# Patient Record
Sex: FEMALE | Race: WHITE | Employment: OTHER | URBAN - NONMETROPOLITAN AREA
[De-identification: names, ages, dates, MRNs, and addresses within clinical notes are randomized per-mention and may not be internally consistent; named-entity substitution may affect disease eponyms.]

---

## 2020-06-23 ENCOUNTER — TELEPHONE (OUTPATIENT)
Dept: VASCULAR SURGERY | Age: 60
End: 2020-06-23

## 2020-06-23 NOTE — TELEPHONE ENCOUNTER
Information given to [facility].  Norma Snyder will be contacted regarding this referral.

## 2020-06-25 ENCOUNTER — OFFICE VISIT (OUTPATIENT)
Dept: VASCULAR SURGERY | Age: 60
End: 2020-06-25
Payer: MEDICARE

## 2020-06-25 VITALS
TEMPERATURE: 96 F | HEART RATE: 96 BPM | OXYGEN SATURATION: 96 % | SYSTOLIC BLOOD PRESSURE: 126 MMHG | DIASTOLIC BLOOD PRESSURE: 76 MMHG | RESPIRATION RATE: 18 BRPM

## 2020-06-25 PROBLEM — C50.919 METASTATIC BREAST CANCER (HCC): Status: ACTIVE | Noted: 2020-06-25

## 2020-06-25 PROBLEM — I82.622 ACUTE DEEP VEIN THROMBOSIS (DVT) OF LEFT UPPER EXTREMITY (HCC): Status: ACTIVE | Noted: 2020-06-25

## 2020-06-25 PROCEDURE — G8427 DOCREV CUR MEDS BY ELIG CLIN: HCPCS | Performed by: NURSE PRACTITIONER

## 2020-06-25 PROCEDURE — 1036F TOBACCO NON-USER: CPT | Performed by: NURSE PRACTITIONER

## 2020-06-25 PROCEDURE — 3017F COLORECTAL CA SCREEN DOC REV: CPT | Performed by: NURSE PRACTITIONER

## 2020-06-25 PROCEDURE — G8421 BMI NOT CALCULATED: HCPCS | Performed by: NURSE PRACTITIONER

## 2020-06-25 PROCEDURE — 99203 OFFICE O/P NEW LOW 30 MIN: CPT | Performed by: NURSE PRACTITIONER

## 2020-06-25 RX ORDER — FLUTICASONE PROPIONATE 50 MCG
1 SPRAY, SUSPENSION (ML) NASAL DAILY
COMMUNITY

## 2020-06-25 RX ORDER — BETHANECHOL CHLORIDE 25 MG/1
25 TABLET ORAL 3 TIMES DAILY
COMMUNITY

## 2020-06-25 RX ORDER — THIAMINE MONONITRATE (VIT B1) 100 MG
100 TABLET ORAL DAILY
COMMUNITY

## 2020-06-25 RX ORDER — CALCIUM CARBONATE 500(1250)
600 TABLET ORAL DAILY
COMMUNITY

## 2020-06-25 RX ORDER — DULOXETIN HYDROCHLORIDE 60 MG/1
60 CAPSULE, DELAYED RELEASE ORAL DAILY
COMMUNITY

## 2020-06-25 RX ORDER — VIT C/B6/B5/MAGNESIUM/HERB 173 50-5-6-5MG
1 CAPSULE ORAL DAILY
COMMUNITY

## 2020-06-25 RX ORDER — OMEPRAZOLE 40 MG/1
40 CAPSULE, DELAYED RELEASE ORAL DAILY
COMMUNITY

## 2020-06-25 RX ORDER — DIAZEPAM 10 MG/1
10 TABLET ORAL EVERY 6 HOURS PRN
COMMUNITY

## 2020-06-25 RX ORDER — HYDROCODONE BITARTRATE AND ACETAMINOPHEN 7.5; 325 MG/1; MG/1
1 TABLET ORAL EVERY 6 HOURS PRN
COMMUNITY

## 2020-06-25 RX ORDER — GABAPENTIN 600 MG/1
800 TABLET ORAL 3 TIMES DAILY
COMMUNITY

## 2020-06-25 RX ORDER — BUDESONIDE AND FORMOTEROL FUMARATE DIHYDRATE 160; 4.5 UG/1; UG/1
2 AEROSOL RESPIRATORY (INHALATION) 2 TIMES DAILY
COMMUNITY

## 2020-06-25 RX ORDER — MAGNESIUM 30 MG
250 TABLET ORAL DAILY
COMMUNITY

## 2020-06-25 RX ORDER — RANITIDINE 300 MG/1
300 CAPSULE ORAL EVERY EVENING
COMMUNITY
End: 2020-06-30

## 2020-06-25 RX ORDER — RALOXIFENE HYDROCHLORIDE 60 MG/1
60 TABLET, FILM COATED ORAL DAILY
COMMUNITY

## 2020-06-25 NOTE — PROGRESS NOTES
Patient Care Team:  JULIUS Juárez - CNP as PCP - General (Nurse Practitioner)      She has a history of breast cancer diagnosed in 2018. She had chemo and she went into remission but then relapsed 10 months later. She had a mediport right subclavian. This was removed when chemo was complete. This was replaced in October. She developed sudden painful arm swelling 2 months ago. She was found to have DVT in left brachial vein. She was treated with xarelto with no success. She was then switched to lovenox and this did not help.  she continues to have significant arm swelling and pain that has only gotten worse. The question is now is this mass compression or a possible subclavian or axillary dvt keeping this from resolving. She is starting to loose some mobility of the arm. Differential diagnosis includes but is not limited to CHF, thyroid disease, venous disease, DVT, SVT, peripheral vascular disease. Myriam Rodirguez is a 61 y.o. female with the following history as recorded in [facility]:  Patient Active Problem List    Diagnosis Date Noted    Metastatic breast cancer ([facility] [address] [address] 06/25/2020    Acute deep vein thrombosis (DVT) of left upper extremity (HCC) 06/25/2020     Current Outpatient Medications   Medication Sig Dispense Refill    omeprazole (PRILOSEC) 40 MG delayed release capsule Take 40 mg by mouth daily      bethanechol (URECHOLINE) 25 MG tablet Take 25 mg by mouth 3 times daily      raNITIdine (ZANTAC) 300 MG capsule Take 300 mg by mouth every evening      calcium carbonate (OSCAL) 500 MG TABS tablet Take 600 mg by mouth daily      raloxifene (EVISTA) 60 MG tablet Take 60 mg by mouth daily      CRANBERRY CONCENTRATE PO Take by mouth      HYDROcodone-acetaminophen (NORCO) 7.5-325 MG per tablet Take 1 tablet by mouth every 6 hours as needed for Pain.  diazePAM (VALIUM) 10 MG tablet Take 10 mg by mouth every 6 hours as needed for Anxiety.       Polyethylene Glycol 3350 (MIRALAX significant hearing loss. Eyes - no sudden vision change or amaurosis. Respiratory - no significant shortness of breath, wheezing, or stridor. No apnea, cough, or chest tightness associated with shortness of breath. Cardiovascular - no chest pain, syncope, or significant dizziness. No palpitations. no significant leg swelling. No claudication. Gastrointestinal - no abdominal swelling or pain. No blood in stool. No severe constipation, diarrhea, nausea, or vomiting. Genitourinary - No difficulty urinating, dysuria, frequency, or urgency. No flank pain or hematuria. Musculoskeletal - no back pain, gait disturbance, or myalgia. Has arm pain and swelling  Skin - no color change, rash, pallor, or new wound. Neurologic - no dizziness, facial asymmetry, or light headedness. No seizures. No speech difficulty or lateralizing weakness. Hematologic - no easy bruising or excessive bleeding. Psychiatric - no severe anxiety or nervousness. No confusion. All other review of systems are negative. Physical Exam    /76 Comment: rigfht  Pulse 96   Temp 96 °F (35.6 °C)   Resp 18   SpO2 96%     Constitutional - well developed, well nourished. No diaphoresis or acute distress. HENT - head normocephalic. Right external ear canal appears normal.  Left external ear canal appears normal.  Septum appears midline. Eyes - conjunctiva normal.  EOMS normal.  No exudate. No icterus. Neck- ROM appears normal, no tracheal deviation. Cardiovascular - Regular rate and rhythm. Heart sounds are normal.  No murmur, rub, or gallop. Carotid pulses are 2+ to palpation bilaterally without bruit. Extremities - Radial and brachial pulses are 2+ to palpation bilaterally. Right femoral pulse: present 2+; Right popliteal pulse: absent Right DP: absent; Right PT absent; Left femoral pulse: present 2+; Left popliteal pulse: absent; Left DP: absent; Left PT: absent  No cyanosis, clubbing.   Has left upper extremity edema that is severe. No signs atheroembolic event. Pulmonary - effort appears normal.  No respiratory distress. Lungs - Breath sounds normal. No wheezes or rales. GI - Abdomen - soft, non tender, bowel sounds X 4 quadrants. No guarding or rebound tenderness. No distension or palpable mass. Genitourinary - deferred. Musculoskeletal - ROM appears normal.  No significant edema. Neurologic - alert and oriented X 3. Physiologic. Skin - warm, dry, and intact. No rash, erythema, or pallor. Psychiatric - mood, affect, and behavior appear normal.  Judgment and thought processes appear normal.    Venous scan - reviewed three venous scans showing     Options have been discussed with the patient including continued medical management. Patient has opted to proceed with continued medical management. Assessment    1. Left arm pain    2. Metastatic breast cancer ([address]    3. Acute deep vein thrombosis (DVT) of brachial vein of left upper extremity (HCC)    4. Arm swelling    5. Port-A-Cath in place          Plan    Ct chest with iv contrast to look for subclavian or axillary dvt or mass compression, possible svc stenosis  Recommend no smoking  Strongly encourage statin therapy    Addendum -   1. No left brachial or left subclavian vein thrombosis is seen. 2. Left arm IV injection with distal left arm arterial opacification   compatible with lower left arm AV fistula. 3. T1 sclerotic change. 4. No acute lung disease     Images reviewed by Dr. Catracho Loving. There is some narrowing in the left subclavian vein. There is also a possible AV fistula. We have recommended she stay on anticoagulation for now. Options have been discussed with the patient including continued medical management vs. proceeding with intraoperative venogram of LUE with possible left subclavian vein angioplasty/stent, lysis. Possible LUE angiogram with possible angioplasty/stent due to AV fistula. Patient has opted to proceed with this. Risks have been discussed with the patient including but not limited to MI, death, CVA, bleed, nerve injury, and infection.

## 2020-06-26 ENCOUNTER — TELEPHONE (OUTPATIENT)
Dept: VASCULAR SURGERY | Age: 60
End: 2020-06-26

## 2020-06-26 NOTE — TELEPHONE ENCOUNTER
I sw the pt to let her know she is scheduled on 6/29/2020 for a CTV chest. The pt will need to arrive at [facility] at 8:45 am and be NPO 4 hours prior. Dr. Maxx Velásquez will be the one to place her IV via u/s guidance. Pt voiced understanding and is aware.

## 2020-06-29 ENCOUNTER — HOSPITAL ENCOUNTER (OUTPATIENT)
Dept: CT IMAGING | Age: 60
Discharge: HOME | End: 2020-06-29
Payer: COMMERCIAL

## 2020-06-29 ENCOUNTER — TELEPHONE (OUTPATIENT)
Dept: VASCULAR SURGERY | Age: 60
End: 2020-06-29

## 2020-06-29 PROCEDURE — 6360000004 HC RX CONTRAST MEDICATION: Performed by: NURSE PRACTITIONER

## 2020-06-29 PROCEDURE — 71260 CT THORAX DX C+: CPT

## 2020-06-29 RX ADMIN — IOPAMIDOL 90 ML: 755 INJECTION, SOLUTION INTRAVENOUS at 10:13

## 2020-06-29 NOTE — TELEPHONE ENCOUNTER
Spoke with patient, details/instructions and medications were addressed (see letter) for surgery at [place] on 7/3/20 with Dr Arjun Hewitt. She will need to arrive at the [address] [place] [address] at 9:30am. [facility] will need a  to take her home. To ensure the health and safety of our patients and staff, [address] [place] [address],[address] has implemented visitor restrictions. Only one person will be allowed to accompany you for your procedure. If you or your visitor are exhibiting signs & symptoms of illness such as fever, cough, sore throat or body aches, we ask that you reschedule your procedure to a later date after your symptoms have been resolved. New policy requires that anyone who comes into the hospital will be required to wear a face mask. A cloth mask is acceptable. She will need to register at the [address] [place] [address] on 6/30/20 at 8:45am for pre-op. New guidelines require a COVID 19 test to be done prior to procedure and strict self quarantine after the test until procedure. You will need to go to the [facility] on Tues. 6/30/20 before 11:00am for the COVID 19 test. You will go to the front of the building and drive under the awning and someone will come to your car. Do not get out of the car. Let them know that you are scheduled for a procedure with Dr. Arjun Hewitt on 7/3/20. After your COVID 19 test you will be required to strict self quarantine until your procedure. Pre-admissions to give a copy of the written instructions to the patient.

## 2020-06-30 ENCOUNTER — HOSPITAL ENCOUNTER (OUTPATIENT)
Dept: GENERAL RADIOLOGY | Age: 60
Discharge: HOME | End: 2020-06-30
Payer: COMMERCIAL

## 2020-06-30 ENCOUNTER — HOSPITAL ENCOUNTER (OUTPATIENT)
Dept: PREADMISSION TESTING | Age: 60
Setting detail: OUTPATIENT SURGERY
Discharge: HOME | End: 2020-07-04
Payer: COMMERCIAL

## 2020-06-30 ENCOUNTER — TELEPHONE (OUTPATIENT)
Dept: VASCULAR SURGERY | Age: 60
End: 2020-06-30

## 2020-06-30 ENCOUNTER — OFFICE VISIT (OUTPATIENT)
Age: 60
End: 2020-06-30

## 2020-06-30 VITALS — BODY MASS INDEX: 22.5 KG/M2 | WEIGHT: 140 LBS | HEIGHT: 66 IN

## 2020-06-30 VITALS — OXYGEN SATURATION: 95 % | TEMPERATURE: 97.5 F

## 2020-06-30 LAB
ANION GAP SERPL CALCULATED.3IONS-SCNC: 6 MMOL/L (ref 7–19)
APTT: 29.1 SEC (ref 26–36.2)
BUN BLDV-MCNC: 12 MG/DL (ref 6–20)
CALCIUM SERPL-MCNC: 9.4 MG/DL (ref 8.6–10)
CHLORIDE BLD-SCNC: 102 MMOL/L (ref 98–111)
CO2: 31 MMOL/L (ref 22–29)
CREAT SERPL-MCNC: 0.6 MG/DL (ref 0.5–0.9)
EKG P AXIS: 57 DEGREES
EKG P-R INTERVAL: 106 MS
EKG Q-T INTERVAL: 360 MS
EKG QRS DURATION: 82 MS
EKG QTC CALCULATION (BAZETT): 410 MS
EKG T AXIS: 26 DEGREES
GFR NON-AFRICAN AMERICAN: >60
GLUCOSE BLD-MCNC: 80 MG/DL (ref 74–109)
HCT VFR BLD CALC: 33.7 % (ref 37–47)
HEMOGLOBIN: 10.6 G/DL (ref 12–16)
INR BLD: 0.97 (ref 0.88–1.18)
MCH RBC QN AUTO: 33 PG (ref 27–31)
MCHC RBC AUTO-ENTMCNC: 31.5 G/DL (ref 33–37)
MCV RBC AUTO: 105 FL (ref 81–99)
PDW BLD-RTO: 17.1 % (ref 11.5–14.5)
PLATELET # BLD: 143 K/UL (ref 130–400)
PMV BLD AUTO: 10.3 FL (ref 9.4–12.3)
POTASSIUM SERPL-SCNC: 4.7 MMOL/L (ref 3.5–5)
PROTHROMBIN TIME: 12.8 SEC (ref 12–14.6)
RBC # BLD: 3.21 M/UL (ref 4.2–5.4)
SARS-COV-2, PCR: NOT DETECTED
SODIUM BLD-SCNC: 139 MMOL/L (ref 136–145)
WBC # BLD: 3.6 K/UL (ref 4.8–10.8)

## 2020-06-30 PROCEDURE — 87081 CULTURE SCREEN ONLY: CPT

## 2020-06-30 PROCEDURE — 93005 ELECTROCARDIOGRAM TRACING: CPT

## 2020-06-30 PROCEDURE — 93010 ELECTROCARDIOGRAM REPORT: CPT | Performed by: INTERNAL MEDICINE

## 2020-06-30 PROCEDURE — 71046 X-RAY EXAM CHEST 2 VIEWS: CPT

## 2020-06-30 PROCEDURE — 80048 BASIC METABOLIC PNL TOTAL CA: CPT

## 2020-06-30 PROCEDURE — 85027 COMPLETE CBC AUTOMATED: CPT

## 2020-06-30 PROCEDURE — 85610 PROTHROMBIN TIME: CPT

## 2020-06-30 PROCEDURE — 85730 THROMBOPLASTIN TIME PARTIAL: CPT

## 2020-07-01 LAB — MRSA CULTURE ONLY: NORMAL

## 2020-07-02 ENCOUNTER — PREP FOR PROCEDURE (OUTPATIENT)
Dept: VASCULAR SURGERY | Age: 60
End: 2020-07-02

## 2020-07-02 RX ORDER — SODIUM CHLORIDE 0.9 % (FLUSH) 0.9 %
10 SYRINGE (ML) INJECTION EVERY 12 HOURS SCHEDULED
Status: CANCELLED | OUTPATIENT
Start: 2020-07-02

## 2020-07-02 RX ORDER — SODIUM CHLORIDE 0.9 % (FLUSH) 0.9 %
10 SYRINGE (ML) INJECTION PRN
Status: CANCELLED | OUTPATIENT
Start: 2020-07-02

## 2020-07-02 RX ORDER — ASPIRIN 81 MG/1
81 TABLET ORAL ONCE
Status: CANCELLED | OUTPATIENT
Start: 2020-07-02 | End: 2020-07-02

## 2020-07-02 NOTE — H&P (VIEW-ONLY)
Patient Care Team:  JULIUS Saba - CNP as PCP - General (Nurse Practitioner)        She has a history of breast cancer diagnosed in 2018. She had chemo and she went into remission but then relapsed 10 months later. She had a mediport right subclavian. This was removed when chemo was complete. This was replaced in October. She developed sudden painful arm swelling 2 months ago. She was found to have DVT in left brachial vein. She was treated with xarelto with no success. She was then switched to lovenox and this did not help.  she continues to have significant arm swelling and pain that has only gotten worse. The question is now is this mass compression or a possible subclavian or axillary dvt keeping this from resolving.   She is starting to loose some mobility of the arm.     Differential diagnosis includes but is not limited to CHF, thyroid disease, venous disease, DVT, SVT, peripheral vascular disease.     Arelis Landers is a 61 y.o. female with the following history as recorded in [facility]:       Patient Active Problem List     Diagnosis Date Noted    Metastatic breast cancer ([facility] 06/25/2020    Acute deep vein thrombosis (DVT) of left upper extremity ([facility] 06/25/2020      Current Facility-Administered Medications          Current Outpatient Medications   Medication Sig Dispense Refill    omeprazole (PRILOSEC) 40 MG delayed release capsule Take 40 mg by mouth daily        bethanechol (URECHOLINE) 25 MG tablet Take 25 mg by mouth 3 times daily        raNITIdine (ZANTAC) 300 MG capsule Take 300 mg by mouth every evening        calcium carbonate (OSCAL) 500 MG TABS tablet Take 600 mg by mouth daily        raloxifene (EVISTA) 60 MG tablet Take 60 mg by mouth daily        CRANBERRY CONCENTRATE PO Take by mouth        HYDROcodone-acetaminophen (NORCO) 7.5-325 MG per tablet Take 1 tablet by mouth every 6 hours as needed for Pain.        diazePAM (VALIUM) 10 MG tablet Take 10 mg by mouth every 6 hours as needed for Anxiety.        Polyethylene Glycol 3350 (MIRALAX PO) Take by mouth        gabapentin (NEURONTIN) 600 MG tablet Take 800 mg by mouth 3 times daily.        DULoxetine (CYMBALTA) 60 MG extended release capsule Take 60 mg by mouth daily        vitamin B-1 (THIAMINE) 100 MG tablet Take 100 mg by mouth daily        magnesium 30 MG tablet Take 250 mg by mouth 2 times daily        BIOTIN PO Take 1,000 % by mouth        Turmeric 500 MG CAPS Take by mouth        Alpha-Lipoic Acid 600 MG TABS Take 600 mg by mouth daily        Loratadine (CLARITIN PO) Take by mouth        fluticasone (FLONASE) 50 MCG/ACT nasal spray 1 spray by Each Nostril route daily        budesonide-formoterol (SYMBICORT) 160-4.5 MCG/ACT AERO Inhale 2 puffs into the lungs 2 times daily        Albuterol Sulfate, sensor, (PROAIR DIGIHALER) 108 (90 Base) MCG/ACT AEPB Inhale into the lungs        Garlic (GARLIQUE PO) Take by mouth          No current facility-administered medications for this visit.          Allergies: Patient has no known allergies. Past Medical History        Past Medical History:   Diagnosis Date    Arthritis      Asthma      Cano esophagus      Cancer (HCC)       breast    Depressed      Enchondroma      Fibromyalgia      Osteoporosis           Past Surgical History         Past Surgical History:   Procedure Laterality Date    BREAST SURGERY        COLONOSCOPY        FOOT SURGERY        GALLBLADDER SURGERY        HYSTERECTOMY, VAGINAL        NECK SURGERY        SINUS ENDOSCOPY             Family History   History reviewed. No pertinent family history. Social History            Tobacco Use    Smoking status: Never Smoker    Smokeless tobacco: Never Used   Substance Use Topics    Alcohol use: Not on file       Comment: every now and then         Old records obtained from the referring provider.   These records have been reviewed and summarized.        Review of Systems     Constitutional - no significant activity change, appetite change. Patient reports weight has been stable. No fever or chills. No diaphoresis or significant fatigue. HENT - no significant rhinorrhea or epistaxis. No tinnitus or significant hearing loss. Eyes - no sudden vision change or amaurosis. Respiratory - no significant shortness of breath, wheezing, or stridor. No apnea, cough, or chest tightness associated with shortness of breath. Cardiovascular - no chest pain, syncope, or significant dizziness. No palpitations. no significant leg swelling. No claudication. Gastrointestinal - no abdominal swelling or pain. No blood in stool. No severe constipation, diarrhea, nausea, or vomiting. Genitourinary - No difficulty urinating, dysuria, frequency, or urgency. No flank pain or hematuria. Musculoskeletal - no back pain, gait disturbance, or myalgia. Has arm pain and swelling  Skin - no color change, rash, pallor, or new wound. Neurologic - no dizziness, facial asymmetry, or light headedness. No seizures. No speech difficulty or lateralizing weakness. Hematologic - no easy bruising or excessive bleeding. Psychiatric - no severe anxiety or nervousness. No confusion. All other review of systems are negative.     Physical Exam     /76 Comment: rigfht  Pulse 96   Temp 96 °F (35.6 °C)   Resp 18   SpO2 96%      Constitutional - well developed, well nourished. No diaphoresis or acute distress. HENT - head normocephalic. Right external ear canal appears normal.  Left external ear canal appears normal.  Septum appears midline. Eyes - conjunctiva normal.  EOMS normal.  No exudate. No icterus. Neck- ROM appears normal, no tracheal deviation. Cardiovascular - Regular rate and rhythm. Heart sounds are normal.  No murmur, rub, or gallop. Carotid pulses are 2+ to palpation bilaterally without bruit. Extremities - Radial and brachial pulses are 2+ to palpation bilaterally. Right femoral pulse: present 2+; Right popliteal pulse: absent Right DP: absent; Right PT absent; Left femoral pulse: present 2+; Left popliteal pulse: absent; Left DP: absent; Left PT: absent  No cyanosis, clubbing. Has left upper extremity edema that is severe. No signs atheroembolic event. Pulmonary - effort appears normal.  No respiratory distress. Lungs - Breath sounds normal. No wheezes or rales. GI - Abdomen - soft, non tender, bowel sounds X 4 quadrants. No guarding or rebound tenderness. No distension or palpable mass. Genitourinary - deferred. Musculoskeletal - ROM appears normal.  No significant edema. Neurologic - alert and oriented X 3. Physiologic. Skin - warm, dry, and intact. No rash, erythema, or pallor. Psychiatric - mood, affect, and behavior appear normal.  Judgment and thought processes appear normal.     Venous scan - reviewed three venous scans showing      Options have been discussed with the patient including continued medical management. Patient has opted to proceed with continued medical management.        Assessment     1. Left arm pain    2. Metastatic breast cancer ([address]    3. Acute deep vein thrombosis (DVT) of brachial vein of left upper extremity (HCC)    4. Arm swelling    5. Port-A-Cath in place             Plan     Ct chest with iv contrast to look for subclavian or axillary dvt or mass compression, possible svc stenosis  Recommend no smoking  Strongly encourage statin therapy     Addendum -   1. No left brachial or left subclavian vein thrombosis is seen. 2. Left arm IV injection with distal left arm arterial opacification   compatible with lower left arm AV fistula. 3. T1 sclerotic change. 4. No acute lung disease      Images reviewed by Dr. Kelechi Coreas. There is some narrowing in the left subclavian vein. There is also a possible AV fistula. We have recommended she stay on anticoagulation for now.   Options have been discussed with the patient including continued medical management vs. proceeding with intraoperative venogram of LUE with possible left subclavian vein angioplasty/stent, lysis. Possible LUE angiogram with possible angioplasty/stent due to AV fistula. Patient has opted to proceed with this.   Risks have been discussed with the patient including but not limited to MI, death, CVA, bleed, nerve injury, and infection.

## 2020-07-02 NOTE — H&P
Patient Care Team:  JULIUS Magaña - CNP as PCP - General (Nurse Practitioner)        She has a history of breast cancer diagnosed in 2018. She had chemo and she went into remission but then relapsed 10 months later. She had a mediport right subclavian. This was removed when chemo was complete. This was replaced in October. She developed sudden painful arm swelling 2 months ago. She was found to have DVT in left brachial vein. She was treated with xarelto with no success. She was then switched to lovenox and this did not help.  she continues to have significant arm swelling and pain that has only gotten worse. The question is now is this mass compression or a possible subclavian or axillary dvt keeping this from resolving.   She is starting to loose some mobility of the arm.     Differential diagnosis includes but is not limited to CHF, thyroid disease, venous disease, DVT, SVT, peripheral vascular disease.     Santiago Rivas is a 61 y.o. female with the following history as recorded in [facility]:       Patient Active Problem List     Diagnosis Date Noted    Metastatic breast cancer ([facility] 06/25/2020    Acute deep vein thrombosis (DVT) of left upper extremity ([facility] 06/25/2020      Current Facility-Administered Medications          Current Outpatient Medications   Medication Sig Dispense Refill    omeprazole (PRILOSEC) 40 MG delayed release capsule Take 40 mg by mouth daily        bethanechol (URECHOLINE) 25 MG tablet Take 25 mg by mouth 3 times daily        raNITIdine (ZANTAC) 300 MG capsule Take 300 mg by mouth every evening        calcium carbonate (OSCAL) 500 MG TABS tablet Take 600 mg by mouth daily        raloxifene (EVISTA) 60 MG tablet Take 60 mg by mouth daily        CRANBERRY CONCENTRATE PO Take by mouth        HYDROcodone-acetaminophen (NORCO) 7.5-325 MG per tablet Take 1 tablet by mouth every 6 hours as needed for Pain.        diazePAM (VALIUM) 10 MG tablet Take 10 mg by mouth every 6 hours as needed for Anxiety.        Polyethylene Glycol 3350 (MIRALAX PO) Take by mouth        gabapentin (NEURONTIN) 600 MG tablet Take 800 mg by mouth 3 times daily.        DULoxetine (CYMBALTA) 60 MG extended release capsule Take 60 mg by mouth daily        vitamin B-1 (THIAMINE) 100 MG tablet Take 100 mg by mouth daily        magnesium 30 MG tablet Take 250 mg by mouth 2 times daily        BIOTIN PO Take 1,000 % by mouth        Turmeric 500 MG CAPS Take by mouth        Alpha-Lipoic Acid 600 MG TABS Take 600 mg by mouth daily        Loratadine (CLARITIN PO) Take by mouth        fluticasone (FLONASE) 50 MCG/ACT nasal spray 1 spray by Each Nostril route daily        budesonide-formoterol (SYMBICORT) 160-4.5 MCG/ACT AERO Inhale 2 puffs into the lungs 2 times daily        Albuterol Sulfate, sensor, (PROAIR DIGIHALER) 108 (90 Base) MCG/ACT AEPB Inhale into the lungs        Garlic (GARLIQUE PO) Take by mouth          No current facility-administered medications for this visit.          Allergies: Patient has no known allergies. Past Medical History        Past Medical History:   Diagnosis Date    Arthritis      Asthma      Cano esophagus      Cancer (HCC)       breast    Depressed      Enchondroma      Fibromyalgia      Osteoporosis           Past Surgical History         Past Surgical History:   Procedure Laterality Date    BREAST SURGERY        COLONOSCOPY        FOOT SURGERY        GALLBLADDER SURGERY        HYSTERECTOMY, VAGINAL        NECK SURGERY        SINUS ENDOSCOPY             Family History   History reviewed. No pertinent family history. Social History            Tobacco Use    Smoking status: Never Smoker    Smokeless tobacco: Never Used   Substance Use Topics    Alcohol use: Not on file       Comment: every now and then         Old records obtained from the referring provider.   These records have been reviewed and summarized.        Review of Systems     Constitutional - no significant activity change, appetite change. Patient reports weight has been stable. No fever or chills. No diaphoresis or significant fatigue. HENT - no significant rhinorrhea or epistaxis. No tinnitus or significant hearing loss. Eyes - no sudden vision change or amaurosis. Respiratory - no significant shortness of breath, wheezing, or stridor. No apnea, cough, or chest tightness associated with shortness of breath. Cardiovascular - no chest pain, syncope, or significant dizziness. No palpitations. no significant leg swelling. No claudication. Gastrointestinal - no abdominal swelling or pain. No blood in stool. No severe constipation, diarrhea, nausea, or vomiting. Genitourinary - No difficulty urinating, dysuria, frequency, or urgency. No flank pain or hematuria. Musculoskeletal - no back pain, gait disturbance, or myalgia. Has arm pain and swelling  Skin - no color change, rash, pallor, or new wound. Neurologic - no dizziness, facial asymmetry, or light headedness. No seizures. No speech difficulty or lateralizing weakness. Hematologic - no easy bruising or excessive bleeding. Psychiatric - no severe anxiety or nervousness. No confusion. All other review of systems are negative.     Physical Exam     /76 Comment: rigfht  Pulse 96   Temp 96 °F (35.6 °C)   Resp 18   SpO2 96%      Constitutional - well developed, well nourished. No diaphoresis or acute distress. HENT - head normocephalic. Right external ear canal appears normal.  Left external ear canal appears normal.  Septum appears midline. Eyes - conjunctiva normal.  EOMS normal.  No exudate. No icterus. Neck- ROM appears normal, no tracheal deviation. Cardiovascular - Regular rate and rhythm. Heart sounds are normal.  No murmur, rub, or gallop. Carotid pulses are 2+ to palpation bilaterally without bruit. Extremities - Radial and brachial pulses are 2+ to palpation bilaterally. Right femoral pulse: present 2+; Right popliteal pulse: absent Right DP: absent; Right PT absent; Left femoral pulse: present 2+; Left popliteal pulse: absent; Left DP: absent; Left PT: absent  No cyanosis, clubbing. Has left upper extremity edema that is severe. No signs atheroembolic event. Pulmonary - effort appears normal.  No respiratory distress. Lungs - Breath sounds normal. No wheezes or rales. GI - Abdomen - soft, non tender, bowel sounds X 4 quadrants. No guarding or rebound tenderness. No distension or palpable mass. Genitourinary - deferred. Musculoskeletal - ROM appears normal.  No significant edema. Neurologic - alert and oriented X 3. Physiologic. Skin - warm, dry, and intact. No rash, erythema, or pallor. Psychiatric - mood, affect, and behavior appear normal.  Judgment and thought processes appear normal.     Venous scan - reviewed three venous scans showing      Options have been discussed with the patient including continued medical management. Patient has opted to proceed with continued medical management.        Assessment     1. Left arm pain    2. Metastatic breast cancer ([address]    3. Acute deep vein thrombosis (DVT) of brachial vein of left upper extremity (HCC)    4. Arm swelling    5. Port-A-Cath in place             Plan     Ct chest with iv contrast to look for subclavian or axillary dvt or mass compression, possible svc stenosis  Recommend no smoking  Strongly encourage statin therapy     Addendum -   1. No left brachial or left subclavian vein thrombosis is seen. 2. Left arm IV injection with distal left arm arterial opacification   compatible with lower left arm AV fistula. 3. T1 sclerotic change. 4. No acute lung disease      Images reviewed by Dr. Brad Waller. There is some narrowing in the left subclavian vein. There is also a possible AV fistula. We have recommended she stay on anticoagulation for now.   Options have been discussed with the patient including continued medical management vs. proceeding with intraoperative venogram of LUE with possible left subclavian vein angioplasty/stent, lysis. Possible LUE angiogram with possible angioplasty/stent due to AV fistula. Patient has opted to proceed with this.   Risks have been discussed with the patient including but not limited to MI, death, CVA, bleed, nerve injury, and infection.

## 2020-07-03 ENCOUNTER — HOSPITAL ENCOUNTER (OUTPATIENT)
Age: 60
Setting detail: OUTPATIENT SURGERY
Discharge: HOME | End: 2020-07-03
Attending: SURGERY | Admitting: SURGERY
Payer: COMMERCIAL

## 2020-07-03 ENCOUNTER — ANESTHESIA EVENT (OUTPATIENT)
Dept: OPERATING ROOM | Age: 60
End: 2020-07-03
Payer: COMMERCIAL

## 2020-07-03 ENCOUNTER — ANESTHESIA (OUTPATIENT)
Dept: OPERATING ROOM | Age: 60
End: 2020-07-03
Payer: COMMERCIAL

## 2020-07-03 ENCOUNTER — APPOINTMENT (OUTPATIENT)
Dept: INTERVENTIONAL RADIOLOGY/VASCULAR | Age: 60
End: 2020-07-03
Attending: SURGERY
Payer: COMMERCIAL

## 2020-07-03 VITALS
DIASTOLIC BLOOD PRESSURE: 67 MMHG | HEART RATE: 83 BPM | RESPIRATION RATE: 16 BRPM | TEMPERATURE: 97.1 F | BODY MASS INDEX: 22.5 KG/M2 | OXYGEN SATURATION: 92 % | SYSTOLIC BLOOD PRESSURE: 107 MMHG | HEIGHT: 66 IN | WEIGHT: 140 LBS

## 2020-07-03 VITALS
OXYGEN SATURATION: 99 % | DIASTOLIC BLOOD PRESSURE: 57 MMHG | RESPIRATION RATE: 2 BRPM | TEMPERATURE: 96.1 F | SYSTOLIC BLOOD PRESSURE: 112 MMHG

## 2020-07-03 PROCEDURE — 75774 ARTERY X-RAY EACH VESSEL: CPT | Performed by: SURGERY

## 2020-07-03 PROCEDURE — 6360000002 HC RX W HCPCS: Performed by: SURGERY

## 2020-07-03 PROCEDURE — C1769 GUIDE WIRE: HCPCS | Performed by: SURGERY

## 2020-07-03 PROCEDURE — 75710 ARTERY X-RAYS ARM/LEG: CPT | Performed by: SURGERY

## 2020-07-03 PROCEDURE — 7100000011 HC PHASE II RECOVERY - ADDTL 15 MIN: Performed by: SURGERY

## 2020-07-03 PROCEDURE — 7100000001 HC PACU RECOVERY - ADDTL 15 MIN: Performed by: SURGERY

## 2020-07-03 PROCEDURE — 6370000000 HC RX 637 (ALT 250 FOR IP): Performed by: NURSE PRACTITIONER

## 2020-07-03 PROCEDURE — 7100000000 HC PACU RECOVERY - FIRST 15 MIN: Performed by: SURGERY

## 2020-07-03 PROCEDURE — 3600000007 HC SURGERY HYBRID BASE: Performed by: SURGERY

## 2020-07-03 PROCEDURE — 36005 INJECTION EXT VENOGRAPHY: CPT | Performed by: SURGERY

## 2020-07-03 PROCEDURE — 37252 INTRVASC US NONCORONARY 1ST: CPT | Performed by: SURGERY

## 2020-07-03 PROCEDURE — 2580000003 HC RX 258: Performed by: SURGERY

## 2020-07-03 PROCEDURE — 3700000000 HC ANESTHESIA ATTENDED CARE: Performed by: SURGERY

## 2020-07-03 PROCEDURE — 2580000003 HC RX 258: Performed by: ANESTHESIOLOGY

## 2020-07-03 PROCEDURE — 7100000010 HC PHASE II RECOVERY - FIRST 15 MIN: Performed by: SURGERY

## 2020-07-03 PROCEDURE — C1894 INTRO/SHEATH, NON-LASER: HCPCS | Performed by: SURGERY

## 2020-07-03 PROCEDURE — 3700000001 HC ADD 15 MINUTES (ANESTHESIA): Performed by: SURGERY

## 2020-07-03 PROCEDURE — 2500000003 HC RX 250 WO HCPCS

## 2020-07-03 PROCEDURE — C1760 CLOSURE DEV, VASC: HCPCS | Performed by: SURGERY

## 2020-07-03 PROCEDURE — C1887 CATHETER, GUIDING: HCPCS | Performed by: SURGERY

## 2020-07-03 PROCEDURE — 75820 VEIN X-RAY ARM/LEG: CPT | Performed by: SURGERY

## 2020-07-03 PROCEDURE — 3600000017 HC SURGERY HYBRID ADDL 15MIN: Performed by: SURGERY

## 2020-07-03 PROCEDURE — C1753 CATH, INTRAVAS ULTRASOUND: HCPCS | Performed by: SURGERY

## 2020-07-03 PROCEDURE — 36217 PLACE CATHETER IN ARTERY: CPT | Performed by: SURGERY

## 2020-07-03 PROCEDURE — 2709999900 HC NON-CHARGEABLE SUPPLY: Performed by: SURGERY

## 2020-07-03 PROCEDURE — 6360000002 HC RX W HCPCS: Performed by: NURSE PRACTITIONER

## 2020-07-03 PROCEDURE — 36221 PLACE CATH THORACIC AORTA: CPT | Performed by: SURGERY

## 2020-07-03 PROCEDURE — 6360000002 HC RX W HCPCS

## 2020-07-03 DEVICE — DEVICE VASC CLSR 5FR GRY W/ INTEGR SEAL 10ML LOK SYR: Type: IMPLANTABLE DEVICE | Site: GROIN | Status: FUNCTIONAL

## 2020-07-03 RX ORDER — SODIUM CHLORIDE 0.9 % (FLUSH) 0.9 %
10 SYRINGE (ML) INJECTION PRN
Status: CANCELLED | OUTPATIENT
Start: 2020-07-03

## 2020-07-03 RX ORDER — HYDROCODONE BITARTRATE AND ACETAMINOPHEN 5; 325 MG/1; MG/1
1 TABLET ORAL EVERY 4 HOURS PRN
Status: DISCONTINUED | OUTPATIENT
Start: 2020-07-03 | End: 2020-07-03 | Stop reason: HOSPADM

## 2020-07-03 RX ORDER — HEPARIN SODIUM 1000 [USP'U]/ML
INJECTION, SOLUTION INTRAVENOUS; SUBCUTANEOUS PRN
Status: DISCONTINUED | OUTPATIENT
Start: 2020-07-03 | End: 2020-07-03 | Stop reason: SDUPTHER

## 2020-07-03 RX ORDER — SODIUM CHLORIDE 9 MG/ML
INJECTION, SOLUTION INTRAVENOUS CONTINUOUS
Status: DISCONTINUED | OUTPATIENT
Start: 2020-07-03 | End: 2020-07-03 | Stop reason: HOSPADM

## 2020-07-03 RX ORDER — SODIUM CHLORIDE, SODIUM LACTATE, POTASSIUM CHLORIDE, CALCIUM CHLORIDE 600; 310; 30; 20 MG/100ML; MG/100ML; MG/100ML; MG/100ML
INJECTION, SOLUTION INTRAVENOUS CONTINUOUS
Status: DISCONTINUED | OUTPATIENT
Start: 2020-07-03 | End: 2020-07-03 | Stop reason: HOSPADM

## 2020-07-03 RX ORDER — ASPIRIN 81 MG/1
81 TABLET ORAL ONCE
Status: COMPLETED | OUTPATIENT
Start: 2020-07-03 | End: 2020-07-03

## 2020-07-03 RX ORDER — HYDROMORPHONE HYDROCHLORIDE 1 MG/ML
0.25 INJECTION, SOLUTION INTRAMUSCULAR; INTRAVENOUS; SUBCUTANEOUS EVERY 5 MIN PRN
Status: DISCONTINUED | OUTPATIENT
Start: 2020-07-03 | End: 2020-07-03 | Stop reason: HOSPADM

## 2020-07-03 RX ORDER — SODIUM CHLORIDE 9 MG/ML
INJECTION, SOLUTION INTRAVENOUS CONTINUOUS
Status: CANCELLED | OUTPATIENT
Start: 2020-07-03

## 2020-07-03 RX ORDER — ONDANSETRON 2 MG/ML
4 INJECTION INTRAMUSCULAR; INTRAVENOUS EVERY 8 HOURS PRN
Status: DISCONTINUED | OUTPATIENT
Start: 2020-07-03 | End: 2020-07-03 | Stop reason: HOSPADM

## 2020-07-03 RX ORDER — HYDROMORPHONE HYDROCHLORIDE 1 MG/ML
0.5 INJECTION, SOLUTION INTRAMUSCULAR; INTRAVENOUS; SUBCUTANEOUS EVERY 5 MIN PRN
Status: DISCONTINUED | OUTPATIENT
Start: 2020-07-03 | End: 2020-07-03 | Stop reason: HOSPADM

## 2020-07-03 RX ORDER — FENTANYL CITRATE 50 UG/ML
50 INJECTION, SOLUTION INTRAMUSCULAR; INTRAVENOUS
Status: CANCELLED | OUTPATIENT
Start: 2020-07-03

## 2020-07-03 RX ORDER — METOCLOPRAMIDE HYDROCHLORIDE 5 MG/ML
10 INJECTION INTRAMUSCULAR; INTRAVENOUS
Status: DISCONTINUED | OUTPATIENT
Start: 2020-07-03 | End: 2020-07-03 | Stop reason: HOSPADM

## 2020-07-03 RX ORDER — LABETALOL HYDROCHLORIDE 5 MG/ML
5 INJECTION, SOLUTION INTRAVENOUS EVERY 10 MIN PRN
Status: DISCONTINUED | OUTPATIENT
Start: 2020-07-03 | End: 2020-07-03 | Stop reason: HOSPADM

## 2020-07-03 RX ORDER — FENTANYL CITRATE 50 UG/ML
INJECTION, SOLUTION INTRAMUSCULAR; INTRAVENOUS PRN
Status: DISCONTINUED | OUTPATIENT
Start: 2020-07-03 | End: 2020-07-03 | Stop reason: SDUPTHER

## 2020-07-03 RX ORDER — PROPOFOL 10 MG/ML
INJECTION, EMULSION INTRAVENOUS PRN
Status: DISCONTINUED | OUTPATIENT
Start: 2020-07-03 | End: 2020-07-03 | Stop reason: SDUPTHER

## 2020-07-03 RX ORDER — HYDROCODONE BITARTRATE AND ACETAMINOPHEN 5; 325 MG/1; MG/1
2 TABLET ORAL EVERY 4 HOURS PRN
Status: DISCONTINUED | OUTPATIENT
Start: 2020-07-03 | End: 2020-07-03 | Stop reason: HOSPADM

## 2020-07-03 RX ORDER — MORPHINE SULFATE 4 MG/ML
2 INJECTION, SOLUTION INTRAMUSCULAR; INTRAVENOUS EVERY 5 MIN PRN
Status: DISCONTINUED | OUTPATIENT
Start: 2020-07-03 | End: 2020-07-03 | Stop reason: HOSPADM

## 2020-07-03 RX ORDER — ROCURONIUM BROMIDE 10 MG/ML
INJECTION, SOLUTION INTRAVENOUS PRN
Status: DISCONTINUED | OUTPATIENT
Start: 2020-07-03 | End: 2020-07-03 | Stop reason: SDUPTHER

## 2020-07-03 RX ORDER — PROMETHAZINE HYDROCHLORIDE 25 MG/ML
6.25 INJECTION, SOLUTION INTRAMUSCULAR; INTRAVENOUS
Status: DISCONTINUED | OUTPATIENT
Start: 2020-07-03 | End: 2020-07-03 | Stop reason: HOSPADM

## 2020-07-03 RX ORDER — SODIUM CHLORIDE, SODIUM LACTATE, POTASSIUM CHLORIDE, CALCIUM CHLORIDE 600; 310; 30; 20 MG/100ML; MG/100ML; MG/100ML; MG/100ML
INJECTION, SOLUTION INTRAVENOUS CONTINUOUS
Status: CANCELLED | OUTPATIENT
Start: 2020-07-03

## 2020-07-03 RX ORDER — LIDOCAINE HYDROCHLORIDE 10 MG/ML
1 INJECTION, SOLUTION EPIDURAL; INFILTRATION; INTRACAUDAL; PERINEURAL
Status: CANCELLED | OUTPATIENT
Start: 2020-07-03 | End: 2020-07-03

## 2020-07-03 RX ORDER — DIPHENHYDRAMINE HYDROCHLORIDE 50 MG/ML
12.5 INJECTION INTRAMUSCULAR; INTRAVENOUS
Status: DISCONTINUED | OUTPATIENT
Start: 2020-07-03 | End: 2020-07-03 | Stop reason: HOSPADM

## 2020-07-03 RX ORDER — SODIUM CHLORIDE 0.9 % (FLUSH) 0.9 %
10 SYRINGE (ML) INJECTION PRN
Status: DISCONTINUED | OUTPATIENT
Start: 2020-07-03 | End: 2020-07-03 | Stop reason: HOSPADM

## 2020-07-03 RX ORDER — LIDOCAINE HYDROCHLORIDE 10 MG/ML
INJECTION, SOLUTION INFILTRATION; PERINEURAL PRN
Status: DISCONTINUED | OUTPATIENT
Start: 2020-07-03 | End: 2020-07-03 | Stop reason: SDUPTHER

## 2020-07-03 RX ORDER — FENTANYL CITRATE 50 UG/ML
25 INJECTION, SOLUTION INTRAMUSCULAR; INTRAVENOUS
Status: CANCELLED | OUTPATIENT
Start: 2020-07-03

## 2020-07-03 RX ORDER — MIDAZOLAM HYDROCHLORIDE 1 MG/ML
2 INJECTION INTRAMUSCULAR; INTRAVENOUS
Status: CANCELLED | OUTPATIENT
Start: 2020-07-03 | End: 2020-07-03

## 2020-07-03 RX ORDER — DEXAMETHASONE SODIUM PHOSPHATE 10 MG/ML
INJECTION, SOLUTION INTRAMUSCULAR; INTRAVENOUS PRN
Status: DISCONTINUED | OUTPATIENT
Start: 2020-07-03 | End: 2020-07-03 | Stop reason: SDUPTHER

## 2020-07-03 RX ORDER — ENALAPRILAT 2.5 MG/2ML
1.25 INJECTION INTRAVENOUS
Status: DISCONTINUED | OUTPATIENT
Start: 2020-07-03 | End: 2020-07-03 | Stop reason: HOSPADM

## 2020-07-03 RX ORDER — ONDANSETRON 2 MG/ML
INJECTION INTRAMUSCULAR; INTRAVENOUS PRN
Status: DISCONTINUED | OUTPATIENT
Start: 2020-07-03 | End: 2020-07-03 | Stop reason: SDUPTHER

## 2020-07-03 RX ORDER — MIDAZOLAM HYDROCHLORIDE 1 MG/ML
INJECTION INTRAMUSCULAR; INTRAVENOUS PRN
Status: DISCONTINUED | OUTPATIENT
Start: 2020-07-03 | End: 2020-07-03 | Stop reason: SDUPTHER

## 2020-07-03 RX ORDER — HYDRALAZINE HYDROCHLORIDE 20 MG/ML
5 INJECTION INTRAMUSCULAR; INTRAVENOUS EVERY 10 MIN PRN
Status: DISCONTINUED | OUTPATIENT
Start: 2020-07-03 | End: 2020-07-03 | Stop reason: HOSPADM

## 2020-07-03 RX ORDER — SODIUM CHLORIDE 0.9 % (FLUSH) 0.9 %
10 SYRINGE (ML) INJECTION EVERY 12 HOURS SCHEDULED
Status: DISCONTINUED | OUTPATIENT
Start: 2020-07-03 | End: 2020-07-03 | Stop reason: HOSPADM

## 2020-07-03 RX ORDER — MEPERIDINE HYDROCHLORIDE 50 MG/ML
12.5 INJECTION INTRAMUSCULAR; INTRAVENOUS; SUBCUTANEOUS EVERY 5 MIN PRN
Status: DISCONTINUED | OUTPATIENT
Start: 2020-07-03 | End: 2020-07-03 | Stop reason: HOSPADM

## 2020-07-03 RX ORDER — SUCCINYLCHOLINE/SOD CL,ISO/PF 100 MG/5ML
SYRINGE (ML) INTRAVENOUS PRN
Status: DISCONTINUED | OUTPATIENT
Start: 2020-07-03 | End: 2020-07-03 | Stop reason: SDUPTHER

## 2020-07-03 RX ORDER — ACETAMINOPHEN 325 MG/1
650 TABLET ORAL EVERY 4 HOURS PRN
Status: DISCONTINUED | OUTPATIENT
Start: 2020-07-03 | End: 2020-07-03 | Stop reason: HOSPADM

## 2020-07-03 RX ORDER — MORPHINE SULFATE 4 MG/ML
4 INJECTION, SOLUTION INTRAMUSCULAR; INTRAVENOUS EVERY 5 MIN PRN
Status: DISCONTINUED | OUTPATIENT
Start: 2020-07-03 | End: 2020-07-03 | Stop reason: HOSPADM

## 2020-07-03 RX ORDER — SODIUM CHLORIDE 0.9 % (FLUSH) 0.9 %
10 SYRINGE (ML) INJECTION EVERY 12 HOURS SCHEDULED
Status: CANCELLED | OUTPATIENT
Start: 2020-07-03

## 2020-07-03 RX ADMIN — Medication 140 MG: at 13:28

## 2020-07-03 RX ADMIN — ONDANSETRON HYDROCHLORIDE 4 MG: 2 INJECTION, SOLUTION INTRAMUSCULAR; INTRAVENOUS at 13:38

## 2020-07-03 RX ADMIN — SODIUM CHLORIDE, SODIUM LACTATE, POTASSIUM CHLORIDE, AND CALCIUM CHLORIDE: 600; 310; 30; 20 INJECTION, SOLUTION INTRAVENOUS at 10:01

## 2020-07-03 RX ADMIN — PROPOFOL 160 MG: 10 INJECTION, EMULSION INTRAVENOUS at 13:28

## 2020-07-03 RX ADMIN — SODIUM CHLORIDE, SODIUM LACTATE, POTASSIUM CHLORIDE, AND CALCIUM CHLORIDE: 600; 310; 30; 20 INJECTION, SOLUTION INTRAVENOUS at 14:39

## 2020-07-03 RX ADMIN — FENTANYL CITRATE 100 MCG: 50 INJECTION INTRAMUSCULAR; INTRAVENOUS at 13:24

## 2020-07-03 RX ADMIN — ASPIRIN 81 MG: 81 TABLET, COATED ORAL at 10:05

## 2020-07-03 RX ADMIN — MIDAZOLAM 2 MG: 1 INJECTION INTRAMUSCULAR; INTRAVENOUS at 13:18

## 2020-07-03 RX ADMIN — ROCURONIUM BROMIDE 30 MG: 10 INJECTION, SOLUTION INTRAVENOUS at 13:41

## 2020-07-03 RX ADMIN — Medication 2 G: at 13:38

## 2020-07-03 RX ADMIN — HEPARIN SODIUM 3000 UNITS: 1000 INJECTION, SOLUTION INTRAVENOUS; SUBCUTANEOUS at 13:50

## 2020-07-03 RX ADMIN — DEXAMETHASONE SODIUM PHOSPHATE 10 MG: 10 INJECTION, SOLUTION INTRAMUSCULAR; INTRAVENOUS at 13:38

## 2020-07-03 RX ADMIN — SUGAMMADEX 200 MG: 100 INJECTION, SOLUTION INTRAVENOUS at 14:39

## 2020-07-03 RX ADMIN — LIDOCAINE HYDROCHLORIDE 50 MG: 10 INJECTION, SOLUTION INFILTRATION; PERINEURAL at 13:28

## 2020-07-03 ASSESSMENT — PAIN DESCRIPTION - PROGRESSION
CLINICAL_PROGRESSION: NOT CHANGED

## 2020-07-03 ASSESSMENT — PAIN DESCRIPTION - ONSET
ONSET: AWAKENED FROM SLEEP

## 2020-07-03 ASSESSMENT — ENCOUNTER SYMPTOMS
DYSPNEA ACTIVITY LEVEL: AFTER AMBULATING 1 FLIGHT OF STAIRS
SHORTNESS OF BREATH: 1

## 2020-07-03 ASSESSMENT — PAIN DESCRIPTION - LOCATION
LOCATION: LEG

## 2020-07-03 ASSESSMENT — PAIN DESCRIPTION - FREQUENCY
FREQUENCY: INTERMITTENT

## 2020-07-03 ASSESSMENT — PAIN SCALES - GENERAL
PAINLEVEL_OUTOF10: 2
PAINLEVEL_OUTOF10: 0
PAINLEVEL_OUTOF10: 2
PAINLEVEL_OUTOF10: 2

## 2020-07-03 ASSESSMENT — PAIN DESCRIPTION - PAIN TYPE
TYPE: SURGICAL PAIN

## 2020-07-03 ASSESSMENT — PAIN DESCRIPTION - DESCRIPTORS
DESCRIPTORS: SORE

## 2020-07-03 ASSESSMENT — PAIN DESCRIPTION - ORIENTATION
ORIENTATION: RIGHT
ORIENTATION: RIGHT

## 2020-07-03 ASSESSMENT — LIFESTYLE VARIABLES: SMOKING_STATUS: 0

## 2020-07-03 ASSESSMENT — PAIN - FUNCTIONAL ASSESSMENT
PAIN_FUNCTIONAL_ASSESSMENT: PREVENTS OR INTERFERES SOME ACTIVE ACTIVITIES AND ADLS

## 2020-07-03 NOTE — OP NOTE
Preprocedure diagnosis:  1. Left upper extremity deep vein thrombosis  2. Persistent left upper extremity edema despite adequate anticoagulation  3. Metastatic breast cancer  4. Right subclavian vein single-lumen port  5. Suggestion of left upper extremity abnormal arteriovenous fistula on recent CT chest    Post procedure diagnosis: Same    Procedure:  1. Ultrasound-guided cannulation left distal upper arm basilic vein with 4 Armenian glide sheath  2. Left upper extremity venograms all the way to the superior vena cava  3. Intravascular ultrasound of left axillary, subclavian, and innominate vein  4. Ultrasound-guided cannulation right common femoral artery with 5 Armenian glide sheath  5. Arch aortogram  6. Selective left upper extremity arteriogram  7. Minx closure right common femoral artery puncture site    Surgeon: Tomi Martinez MD    Anesthesia: General    Estimated blood loss: Less than 50 mL    Findings:  1. There is no obvious thrombosis nor occlusion with venography in the left basilic, axillary, subclavian, innominate vein, nor superior vena cava. There was a suggestion of some left subclavian vein stenosis so intravascular ultrasound was performed. There is a physiologic narrowing of the subclavian vein around the level of the clavicle. Certainly appears to be less than 50% surface area stenosis. There is no webbing nor any indication for chronic deep vein thrombosis in this area. 2.  The patient has a normal arch configuration with normal a sending transverse and proximal descending thoracic aorta. She has a normal takeoff of the great vessels. There is no stenosis of the left subclavian, axillary, brachial, radial, and ulnar arteries. There is no evidence for arteriovenous fistula in the left upper extremity. Indications for procedure:     This is a 51-year-old  woman with history of metastatic breast cancer who was referred to me for persistent left upper extremity swelling including pain from the shoulder all the way down to the hand. She was diagnosed with a brachial vein deep vein thrombosis for which she has been adequately treated with Lovenox. I first obtained a CT scan of the chest with intravenous contrast mainly to rule out any tumor that may be pressing on the venous outflow (metastatic disease). The CT scan did not show any obvious metastatic disease in the left chest or around the vein or artery. I thought that the subclavian vein looked a bit irregular. Also, the radiologist mentioned that there may be an arteriovenous fistula in the left arm. Therefore, I am going to perform venography with intravascular ultrasound. If there is no abnormality here, I am going to perform a left upper extremity arteriogram to look for an arteriovenous fistula that was suggested the radiologist on the CT scan. Procedure in detail:    After the patient was consented and given intravenous antibiotics, she was brought to the hybrid operating room and placed on the table supine position. General anesthesia was achieved and the patient's left arm and bilateral groins were prepped and draped in usual sterile procedure. Micropuncture needle was used to cannulate the distal upper arm basilic vein under ultrasound guidance without difficulty. Seldinger technique was utilized to place a 4 Western Nadine glide sheath. The patient was given intravenous heparin. Left upper extremity venograms all the way to the superior vena cava were performed with the above findings. Next, over an 014 wire, intravascular ultrasound was performed of the left axillary, subclavian vein, and innominate vein. The above findings were noted. The sheath was removed and pressure applied for hemostasis. Next, a micropuncture needle was used to cannulate the right common femoral artery under ultrasound guidance and fluoroscopic visualization.   Seldinger technique was utilized to place a 5 Western Nadine glide sheath. Over an 035 wire, a pigtail catheter was placed up to the ascending thoracic aorta. Arch aortography was performed with the above findings. Next, the left subclavian artery was selectively cannulated and left subclavian arteriograms and axillary arteriograms were performed with the above findings. The catheter was then placed into the brachial artery and left upper extremity arteriograms were performed from the brachial artery. The above findings were noted. The sheath was removed and the puncture site closed with the minx device. Pressure was applied for an additional 10 minutes. Sterile dressings were placed. The patient tolerated the procedure well and was awakened from general anesthesia and brought to the recovery room in good condition. It is possible that the patient may simply have persistent swelling from her brachial vein deep vein thrombosis or even some lymphedema. However, this should not cause this significant amount of pain all the way from the shoulder to the hand. Therefore, I think it may be a good idea to refer her back to her oncologist and/or neurology to further search for any metastatic disease versus a neurologic component to her pain. From my perspective, I do think that she should continue with the Lovenox as she did have deep vein thrombosis and she is hypercoagulable from her metastatic breast cancer.

## 2020-07-03 NOTE — ANESTHESIA PRE PROCEDURE
Alpha-Lipoic Acid 600 MG TABS Take 600 mg by mouth daily   Yes Historical Provider, MD   Loratadine (CLARITIN PO) Take 10 mg by mouth daily    Yes Historical Provider, MD   budesonide-formoterol (SYMBICORT) 160-4.5 MCG/ACT AERO Inhale 2 puffs into the lungs 2 times daily   Yes Historical Provider, MD   Garlic (GARLIQUE PO) Take 1 tablet by mouth daily    Yes Historical Provider, MD   Polyethylene Glycol 3350 (MIRALAX PO) Take 1 each by mouth daily as needed     Historical Provider, MD   fluticasone (FLONASE) 50 MCG/ACT nasal spray 1 spray by Each Nostril route daily    Historical Provider, MD   Albuterol Sulfate, sensor, (PROAIR DIGIHALER) 108 (90 Base) MCG/ACT AEPB Inhale 1 puff into the lungs every 4 hours as needed     Historical Provider, MD       Current medications:    Current Facility-Administered Medications   Medication Dose Route Frequency Provider Last Rate Last Dose    ceFAZolin (ANCEF) 2 g in 0.9% sodium chloride 50 mL IVPB  2 g Intravenous On Call to [address] [place] [address], APRDENNIS        sodium chloride flush 0.9 % injection 10 mL  10 mL Intravenous 2 times per day Hermann Bloch, APRN        sodium chloride flush 0.9 % injection 10 mL  10 mL Intravenous PRN Hermann Bloch, APRN        lactated ringers infusion   Intravenous Continuous Grethel Dev,  mL/hr at 07/03/20 1001         Allergies:  No Known Allergies    Problem List:    Patient Active Problem List   Diagnosis Code    Metastatic breast cancer ([facility] C50.919    Acute deep vein thrombosis (DVT) of left upper extremity (HCC) Z50.212       Past Medical History:        Diagnosis Date    Arthritis     Asthma     Cano esophagus     Cancer ([facility] 2018    breast left     Depressed     Enchondroma     Fibromyalgia     Osteoporosis     Pain management     Dr. Gregory Shah       Past Surgical History:        Procedure Laterality Date    BREAST SURGERY Left 2018    lumpectomy    COLONOSCOPY      FOOT SURGERY      GALLBLADDER SURGERY      HYSTERECTOMY      HYSTERECTOMY, VAGINAL      NECK SURGERY      SINUS ENDOSCOPY         Social History:    Social History     Tobacco Use    Smoking status: Never Smoker    Smokeless tobacco: Never Used   Substance Use Topics    Alcohol use: Not on file     Comment: \"every now and then\"                                Counseling given: Not Answered      Vital Signs (Current):   Vitals:    07/03/20 0941   BP: 108/74   Pulse: 91   Resp: 18   Temp: 97.6 °F (36.4 °C)   TempSrc: Temporal   SpO2: 95%   Weight: 140 lb (63.5 kg)   Height: 5' 6\" (1.676 m)                                              BP Readings from Last 3 Encounters:   07/03/20 108/74   06/25/20 126/76       NPO Status: Time of last liquid consumption: 0000                        Time of last solid consumption: 0000                        Date of last liquid consumption: 07/02/20                        Date of last solid food consumption: 07/02/20    BMI:   Wt Readings from Last 3 Encounters:   07/03/20 140 lb (63.5 kg)   06/30/20 140 lb (63.5 kg)     Body mass index is 22.6 kg/m². CBC:   Lab Results   Component Value Date    WBC 3.6 06/30/2020    RBC 3.21 06/30/2020    HGB 10.6 06/30/2020    HCT 33.7 06/30/2020    .0 06/30/2020    RDW 17.1 06/30/2020     06/30/2020       CMP:   Lab Results   Component Value Date     06/30/2020    K 4.7 06/30/2020     06/30/2020    CO2 31 06/30/2020    BUN 12 06/30/2020    CREATININE 0.6 06/30/2020    LABGLOM >60 06/30/2020    GLUCOSE 80 06/30/2020    CALCIUM 9.4 06/30/2020       POC Tests: No results for input(s): POCGLU, POCNA, POCK, POCCL, POCBUN, POCHEMO, POCHCT in the last 72 hours.     Coags:   Lab Results   Component Value Date    PROTIME 12.8 06/30/2020    INR 0.97 06/30/2020    APTT 29.1 06/30/2020       HCG (If Applicable): No results found for: PREGTESTUR, PREGSERUM, HCG, HCGQUANT     ABGs: No results found for: PHART, PO2ART, HNE7HJE, OVO3YMK, BEART,

## 2020-07-03 NOTE — ANESTHESIA POSTPROCEDURE EVALUATION
Department of Anesthesiology  Postprocedure Note    Patient: Alveria Boast  MRN: [id number]  YOB: 1960  Date of evaluation: 7/3/2020  Time:  2:55 PM     Procedure Summary     Date:  07/03/20 Room / Location:  [facility] OR [address]    Anesthesia Start:  1323 Anesthesia Stop:  1454    Procedure:  INTRA-OPERATIVE LEFT UPPER EXTREMITY ANGIOGRAM AND VENOGRAM (Left ) Diagnosis:  (M79.602, I82.622, M79.89, C50.919)    Surgeon:  Adamaris Bernard MD Responsible Provider:  JULIUS Brewer CRNA    Anesthesia Type:  general ASA Status:  3          Anesthesia Type: general    [place] Phase I: [place] Score: 5    [place] Phase II:      Last vitals: Reviewed and per EMR flowsheets.        Anesthesia Post Evaluation

## (undated) DEVICE — GUIDEWIRE VASC L180CM DIA0.035IN L10CM DIA3MM J TIP PTFE S

## (undated) DEVICE — SOLUTION IV 500ML 0.9% SOD CHL PH 5 INJ USP VIAFLX PLAS

## (undated) DEVICE — MEDIA CONTRAST INJ VISIPAQUE 150ML 320MG

## (undated) DEVICE — SOLUTION IV 1000ML 0.9% SOD CHL PH 5 INJ USP VIAFLX PLAS

## (undated) DEVICE — CURAVIEW LED LARYNSCP BLDE

## (undated) DEVICE — CATHETER KIT 5 FR 21 GAX7 CM MICROINTRODUCER GUIDEWIRE STIFF

## (undated) DEVICE — 150 ML FASTURN SYRINGE WITH QUICK FILL TUBE(SET,PKG,150ML FT,W/QFT,RAD,JAPAN,MC)(60729679): Brand: MEDRAD® MARK V PROVIS STERILE DISPOSABLE SYRINGE 150ML & QFT

## (undated) DEVICE — SOLUTION IV IRRIG POUR BRL 0.9% SODIUM CHL 2F7124

## (undated) DEVICE — COVER US PRB W15XL120CM W/ GEL RUBBERBAND TAPE STRP FLD GEN

## (undated) DEVICE — RADIFOCUS GLIDEWIRE: Brand: GLIDEWIRE

## (undated) DEVICE — HI-TORQUE COMMAND ES GUIDE WIRE .014" 300 CM: Brand: HI-TORQUE COMMAND

## (undated) DEVICE — GLOVE SURG SZ 7 L12IN FNGR THK79MIL GRN LTX FREE

## (undated) DEVICE — PACK,UNIVERSAL,NO GOWNS: Brand: MEDLINE

## (undated) DEVICE — Device

## (undated) DEVICE — GLIDESHEATH BASIC HYDROPHILIC COATED INTRODUCER SHEATH: Brand: GLIDESHEATH

## (undated) DEVICE — NG KIT, 21 GA, 10 PACK: Brand: SITE-RITE

## (undated) DEVICE — CATHETER ANGIO AD 5FR L65CM DIA0.049IN GWIRE 0.035IN SHEP

## (undated) DEVICE — CATHETER ANGIO 5FR L110CM GWIRE 0.035IN PGTL W/O SIDE H

## (undated) DEVICE — ANGIOGRAPHY PK

## (undated) DEVICE — BLANKET WRM W29.9XL79.1IN UP BODY FORC AIR MISTRAL-AIR

## (undated) DEVICE — SOLUTION IV 250ML 0.9% SOD CHL PH 5 INJ USP VIAFLX PLAS

## (undated) DEVICE — TUBING ANGIO L72IN 900PSI CLR PVC M TO FEM AIRLESS ROT ADPT

## (undated) DEVICE — C-ARM: Brand: UNBRANDED

## (undated) DEVICE — TUBE ET 7MM NSL ORAL BASIC CUF INTMED MURPHY EYE RADPQ MRK

## (undated) DEVICE — RADIFOCUS GLIDECATH: Brand: GLIDECATH

## (undated) DEVICE — TOWEL,OR,DSP,ST,BLUE,DLX,4/PK,20PK/CS: Brand: MEDLINE